# Patient Record
Sex: MALE | Race: WHITE | NOT HISPANIC OR LATINO | Employment: OTHER | URBAN - METROPOLITAN AREA
[De-identification: names, ages, dates, MRNs, and addresses within clinical notes are randomized per-mention and may not be internally consistent; named-entity substitution may affect disease eponyms.]

---

## 2019-12-05 RX ORDER — CLOBETASOL PROPIONATE 0.5 MG/G
CREAM TOPICAL
Qty: 60 G | Refills: 1 | Status: SHIPPED | OUTPATIENT
Start: 2019-12-05 | End: 2020-12-01 | Stop reason: SDUPTHER

## 2020-12-01 RX ORDER — CLOBETASOL PROPIONATE 0.5 MG/G
CREAM TOPICAL
Qty: 60 G | Refills: 2 | Status: SHIPPED | OUTPATIENT
Start: 2020-12-01

## 2024-05-07 ENCOUNTER — TELEPHONE (OUTPATIENT)
Dept: PAIN MEDICINE | Facility: CLINIC | Age: 64
End: 2024-05-07
Payer: MEDICARE

## 2024-05-07 NOTE — TELEPHONE ENCOUNTER
----- Message from Yuliana Mullins sent at 5/7/2024 12:30 PM CDT -----  Contact: self  Type:  Patient Returning Call    Who Called:Alireza Spencer  Who Left Message for Patient:unsure  Does the patient know what this is regarding?:appt  Would the patient rather a call back or a response via [facility]?   Best Call Back Number:[phone]  Additional Information: toan spencer referring physcian

## 2024-06-04 DIAGNOSIS — M54.50 LUMBAR BACK PAIN: Primary | ICD-10-CM

## 2024-07-30 RX ORDER — OFLOXACIN 3 MG/ML
SOLUTION/ DROPS OPHTHALMIC
COMMUNITY
Start: 2024-05-23

## 2024-07-30 RX ORDER — ATORVASTATIN CALCIUM 80 MG/1
80 TABLET, FILM COATED ORAL NIGHTLY
COMMUNITY
Start: 2024-06-13

## 2024-07-30 RX ORDER — MONTELUKAST SODIUM 4 MG/1
1 TABLET, CHEWABLE ORAL 2 TIMES DAILY
COMMUNITY
Start: 2024-06-11

## 2024-07-30 RX ORDER — GABAPENTIN 100 MG/1
CAPSULE ORAL
COMMUNITY
Start: 2024-02-06 | End: 2024-07-31

## 2024-07-30 RX ORDER — LISINOPRIL 40 MG/1
40 TABLET ORAL
COMMUNITY
Start: 2024-07-03

## 2024-07-30 RX ORDER — HYDROCODONE BITARTRATE AND ACETAMINOPHEN 10; 325 MG/1; MG/1
1 TABLET ORAL EVERY 8 HOURS PRN
COMMUNITY
Start: 2024-07-09

## 2024-07-30 RX ORDER — DEXTROAMPHETAMINE SACCHARATE, AMPHETAMINE ASPARTATE MONOHYDRATE, DEXTROAMPHETAMINE SULFATE AND AMPHETAMINE SULFATE 6.25; 6.25; 6.25; 6.25 MG/1; MG/1; MG/1; MG/1
CAPSULE, EXTENDED RELEASE ORAL 2 TIMES DAILY
COMMUNITY
Start: 2024-04-05 | End: 2024-07-31 | Stop reason: DRUGHIGH

## 2024-07-30 RX ORDER — CYCLOBENZAPRINE HCL 10 MG
10 TABLET ORAL EVERY 8 HOURS PRN
COMMUNITY
Start: 2024-07-22

## 2024-07-30 RX ORDER — DIPHENOXYLATE HYDROCHLORIDE AND ATROPINE SULFATE 2.5; .025 MG/1; MG/1
TABLET ORAL
COMMUNITY
Start: 2024-06-25

## 2024-07-30 RX ORDER — PANTOPRAZOLE SODIUM 40 MG/1
40 TABLET, DELAYED RELEASE ORAL 2 TIMES DAILY
COMMUNITY
Start: 2024-07-08

## 2024-07-30 RX ORDER — IBUPROFEN 800 MG/1
TABLET ORAL
COMMUNITY
Start: 2024-02-28 | End: 2024-07-31

## 2024-07-30 RX ORDER — CLINDAMYCIN HYDROCHLORIDE 300 MG/1
CAPSULE ORAL
COMMUNITY
Start: 2024-04-03 | End: 2024-07-31

## 2024-07-30 RX ORDER — DEXTROAMPHETAMINE SACCHARATE, AMPHETAMINE ASPARTATE MONOHYDRATE, DEXTROAMPHETAMINE SULFATE AND AMPHETAMINE SULFATE 7.5; 7.5; 7.5; 7.5 MG/1; MG/1; MG/1; MG/1
CAPSULE, EXTENDED RELEASE ORAL 2 TIMES DAILY
COMMUNITY
Start: 2024-07-05

## 2024-07-31 ENCOUNTER — OFFICE VISIT (OUTPATIENT)
Dept: PAIN MEDICINE | Facility: CLINIC | Age: 64
End: 2024-07-31
Payer: MEDICARE

## 2024-07-31 VITALS
BODY MASS INDEX: 28.1 KG/M2 | SYSTOLIC BLOOD PRESSURE: 144 MMHG | HEIGHT: 75 IN | HEART RATE: 66 BPM | WEIGHT: 226 LBS | DIASTOLIC BLOOD PRESSURE: 81 MMHG

## 2024-07-31 DIAGNOSIS — M96.1 LUMBAR POST-LAMINECTOMY SYNDROME: ICD-10-CM

## 2024-07-31 DIAGNOSIS — G89.29 CHRONIC MIDLINE LOW BACK PAIN WITH RIGHT-SIDED SCIATICA: ICD-10-CM

## 2024-07-31 DIAGNOSIS — M54.16 LUMBAR RADICULOPATHY: Primary | ICD-10-CM

## 2024-07-31 DIAGNOSIS — M47.816 LUMBAR SPONDYLOSIS: ICD-10-CM

## 2024-07-31 DIAGNOSIS — M54.41 CHRONIC MIDLINE LOW BACK PAIN WITH RIGHT-SIDED SCIATICA: ICD-10-CM

## 2024-07-31 DIAGNOSIS — F17.200 SMOKER: ICD-10-CM

## 2024-07-31 PROBLEM — M54.50 LOW BACK PAIN: Status: ACTIVE | Noted: 2024-07-31

## 2024-07-31 PROBLEM — M54.2 NECK PAIN: Status: ACTIVE | Noted: 2024-07-31

## 2024-07-31 PROBLEM — I10 BENIGN ESSENTIAL HYPERTENSION: Status: ACTIVE | Noted: 2024-07-31

## 2024-07-31 PROBLEM — Z72.0 TOBACCO USER: Status: ACTIVE | Noted: 2024-07-31

## 2024-07-31 PROCEDURE — 99204 OFFICE O/P NEW MOD 45 MIN: CPT | Mod: S$GLB,,, | Performed by: PHYSICAL MEDICINE & REHABILITATION

## 2024-07-31 RX ORDER — ACETAMINOPHEN 500 MG
500 TABLET ORAL EVERY 6 HOURS PRN
COMMUNITY

## 2024-07-31 RX ORDER — GABAPENTIN 300 MG/1
600 CAPSULE ORAL 3 TIMES DAILY
Qty: 180 CAPSULE | Refills: 11 | Status: SHIPPED | OUTPATIENT
Start: 2024-07-31 | End: 2025-07-31

## 2024-07-31 NOTE — PROGRESS NOTES
[facility]      Referring Provider: Christo Rangel Md  [address]  [address]  [place] [address]    Chief Complaint:   Chief Complaint   Patient presents with    Low-back Pain       History of Present Illness: Alireza Rangel is a 64 y.o. male referred by Dr. Christo Rangel for lower back pain.      Onset: 5-7 years  Location: midline lower back  Radiation: right posterior-lateral hip, right lower limb to right lateral calf  Timing: constant  Quality: Aching, Deep, and Stabbing  Exacerbating Factors: sitting or standing in one place too long  Alleviating Factors: lying down and rest  Associated Symptoms: He has some numbness around the right lateral knee since a knee replacement. He denies night fever/night sweats, urinary incontinence/change in function, bowel incontinence/change in function, unexplained weight loss, significant motor weakness, history of cancer, and waking from sleep due to pain    He has been taking pain medication (hydrocodone). He tried PT for his low back that didn't help. He has tried chiropractor that has helped.       Severity: Currently: 8/10   Typical Range: 2-9/10     Exacerbation: 9/10     Functional Limitations: Moderate to severe pain is limiting Home, Work, and Recreation.    Employment Status: Retired, Worked in [employer]    P = 6  E = 8  G = 9  Baseline PEG Score = 7.67  Current PEG Score: 7.67    Opioid Risk Score         Value Time User    Opioid Risk Score  5 7/31/2024 11:01 AM Pati Beck MA             Previous Interventions:  -     Previous Therapies:  PT/OT: yes   Chiropractor: Yes  Relevant Surgery: yes    - Prior lumbar laminectomy L3-4, L4-5 on left in 2018 in [place].    - Right knee replacement with Dr. Stein in Dec 2023   - Left shoulder arthroscopy in [place]  Previous Medications:   - NSAIDS: tylenol  - Muscle Relaxants: flexeril   - TCAs:   - SNRIs:   - Topicals:   - Anticonvulsants:    - Opioids:  hydrocodone    Current Pain Medications:  Tylenol 500mg  Flexeril 10mg   Hydrocodone 10/325 mg q 8 hrs PRN    Blood Thinners: Eliquis (Dr Jefferson- Cardiologist)     Full Medication List:    Current Outpatient Medications:     acetaminophen (TYLENOL) 500 MG tablet, Take 500 mg by mouth every 6 (six) hours as needed for Pain., Disp: , Rfl:     apixaban (ELIQUIS) 5 mg Tab, Take 5 mg by mouth once daily., Disp: , Rfl:     atorvastatin (LIPITOR) 80 MG tablet, Take 80 mg by mouth every evening., Disp: , Rfl:     clobetasoL (TEMOVATE) 0.05 % cream, apply TWICE DAILY for 2 weeks, Disp: 60 g, Rfl: 2    colestipoL (COLESTID) 1 gram Tab, Take 1 g by mouth 2 (two) times daily., Disp: , Rfl:     cyclobenzaprine (FLEXERIL) 10 MG tablet, Take 10 mg by mouth every 8 (eight) hours as needed., Disp: , Rfl:     dextroamphetamine-amphetamine (ADDERALL XR) 30 MG 24 hr capsule, Take by mouth 2 (two) times daily., Disp: , Rfl:     diphenoxylate-atropine 2.5-0.025 mg (LOMOTIL) 2.5-0.025 mg per tablet, TAKE ONE TABLET BY MOUTH THREE TIMES DAILY AS NEEDED FOR DIARRHEA, Disp: , Rfl:     HYDROcodone-acetaminophen (NORCO)  mg per tablet, Take 1 tablet by mouth every 8 (eight) hours as needed., Disp: , Rfl:     Lactobacillus rhamnosus GG (CULTURELLE) 10 billion cell capsule, Take 1 capsule by mouth once daily., Disp: , Rfl:     lisinopriL (PRINIVIL,ZESTRIL) 40 MG tablet, Take 40 mg by mouth., Disp: , Rfl:     ofloxacin (OCUFLOX) 0.3 % ophthalmic solution, Instill 2 drops into affected eye four times a day, Disp: , Rfl:     pantoprazole (PROTONIX) 40 MG tablet, Take 40 mg by mouth 2 (two) times daily., Disp: , Rfl:     gabapentin (NEURONTIN) 300 MG capsule, Take 2 capsules (600 mg total) by mouth 3 (three) times daily., Disp: 180 capsule, Rfl: 11     Review of Systems: See HPI    Allergies:  Pcn [penicillins]     Medical History:   has a past medical history of ADHD (attention deficit hyperactivity disorder), Anemia, Anxiety, Atrial  "fibrillation, Bipolar disorder, Diverticulitis, GERD (gastroesophageal reflux disease), Hyperlipidemia, Hyperprolactinemia, Hypertension, Migraine, and Pilonidal cyst.    Surgical History:   has a past surgical history that includes Cholecystectomy; Appendectomy; Rotator cuff repair (Left, 03/2020); Neck surgery; Insertion of pacemaker (09/16/2013); Knee surgery; Cardiac catheterization; Biceps tendon repair (Right, 05/2021); Total knee arthroplasty (Right, 12/2023); and Shoulder surgery (Left).    Family History:  family history includes Heart disease in his father and mother.    Social History:   reports that he has been smoking cigarettes. He has never been exposed to tobacco smoke. He has never used smokeless tobacco. He reports that he does not currently use alcohol. He reports that he does not use drugs.    Physical Exam:  BP (!) 144/81   Pulse 66   Ht 6' 3" (1.905 m)   Wt 102.5 kg (226 lb)   BMI 28.25 kg/m²   GEN: No acute distress. Calm, comfortable  HENT: Normocephalic, atraumatic, moist mucous membranes  EYE: Anicteric sclera, non-injected.   CV: Non-diaphoretic. Regular Rate. Radial Pulses 2+.  RESP: Breathing comfortably. Chest expansion symmetric.  EXT: No clubbing, cyanosis.   SKIN: Warm, & dry to palpation. No visible rashes or lesions of exposed skin.   PSYCH: Pleasant mood and appropriate affect. Recent and remote memory intact.   GAIT: Independent, antalgic ambulation  Lumbar Spine Exam:       Inspection: No erythema, bruising. No Lateral shift. Healed left paramedian incision      Palpation:   - (-) TTP of lumbar paraspinals bilaterally.  - (-) TTP of sacroiliac joint bilaterally.  - (-) TTP of mildline spinous processes       ROM: (+) Limited in flexion. (+) limited in extension, (+) limitation in lateral bending bilateral.    Directional Preference: flexion      Provocative Maneuvers:  (+) Facet loading bilaterally  (-) Straight Leg Raise bilaterally  (-) TIFFANY bilaterally  Hip Exam:      " "Inspection: No gross deformity or apparent leg length discrepancy      Palpation: (-) TTP to greater trochanteric bursa(s) bilaterally.       ROM: (Slight) limitation in internal rotation bilateral, limitation in external rotation bilateral  Neurologic Exam:     Alert. Speech is fluent and appropriate.     Strength:  5/5 throughout bilateral lower extremities     Sensation:  Grossly intact to light touch in bilateral lower extremities     Reflexes: 2+ in b/l patella, achilles     Tone: No abnormality appreciated in bilateral lower extremities     (+) Munoz bilaterally                Imaging:  - CT Lumbar 8/16/22:       - Xray Lumbar 7/8/2022:                 Labs:  BMP  No results found for: "NA", "K", "CL", "CO2", "BUN", "CREATININE", "CALCIUM", "ANIONGAP", "EGFRNORACEVR"  No results found for: "ALT", "AST", "GGT", "ALKPHOS", "BILITOT"  No results found for: "PLT"    Assessment:  Alireza Rangel is a 64 y.o. male with the following diagnoses based on history, exam, and imaging:    Problem List Items Addressed This Visit          Orthopedic    Low back pain    Relevant Orders    Ambulatory referral/consult to Physical/Occupational Therapy    X-Ray Lumbar Spine AP And Lateral     Other Visit Diagnoses       Lumbar radiculopathy    -  Primary    Relevant Medications    gabapentin (NEURONTIN) 300 MG capsule    Other Relevant Orders    Ambulatory referral/consult to Physical/Occupational Therapy    X-Ray Lumbar Spine AP And Lateral    Lumbar post-laminectomy syndrome        Relevant Medications    gabapentin (NEURONTIN) 300 MG capsule    Other Relevant Orders    Ambulatory referral/consult to Physical/Occupational Therapy    X-Ray Lumbar Spine AP And Lateral    Lumbar spondylosis        Relevant Orders    X-Ray Lumbar Spine AP And Lateral    Smoker        Relevant Orders    Ambulatory referral/consult to Smoking Cessation Program            This is a pleasant 64 y.o. gentleman presenting with:     - Chronic midline " low back pain and right radicular leg pain in L5 distribution suspected. Prior L3-4, L4-5 laminectomy on left.   - Chronic neck pain: Prior C3-4, C5-6 fusion with Dr. Travis   - Comorbidities: Pacemaker (managed by Dr. Jefferson, cannot get MRI)    Treatment Plan:   - PT/OT/HEP: Refer to PT for back pain. Discussed benefits of exercise for pain.   - Refer to smoking cessation program   - Procedures: Consider right TF ARSEN after advanced imaging  - Medications:    - Starting Gabapentin. Prescription: 300 mg tablets. Discussed titration goal of 600 mg TID (1800 mg/day). Titration schedule provided. Discussed possible side effects, including swelling and more commonly sedation. Advised patient to not drive until they know how this medication will effect them.   - Imaging: Reviewed. X-ray   - Labs: Request CBC, CMP from PCP  - Request records from Dr. Travis for any prior imaging records    Follow Up: RTC in 6-8 weeks.     Kristi Jorgensen MD  Interventional Pain Medicine